# Patient Record
Sex: MALE | Race: WHITE | Employment: OTHER | URBAN - METROPOLITAN AREA
[De-identification: names, ages, dates, MRNs, and addresses within clinical notes are randomized per-mention and may not be internally consistent; named-entity substitution may affect disease eponyms.]

---

## 2018-01-28 PROBLEM — N13.9 OBSTRUCTIVE UROPATHY: Status: ACTIVE | Noted: 2018-01-28

## 2018-01-28 PROBLEM — N20.0 NEPHROLITHIASIS: Status: ACTIVE | Noted: 2018-01-28

## 2018-01-30 PROBLEM — K50.10 CROHN'S COLITIS (HCC): Status: ACTIVE | Noted: 2018-01-30

## 2018-02-28 ENCOUNTER — HOSPITAL ENCOUNTER (OUTPATIENT)
Dept: GENERAL RADIOLOGY | Age: 58
Discharge: HOME | End: 2018-02-28
Payer: COMMERCIAL

## 2018-02-28 DIAGNOSIS — N20.0 KIDNEY STONES: ICD-10-CM

## 2018-02-28 PROCEDURE — 74018 RADEX ABDOMEN 1 VIEW: CPT

## 2018-04-27 PROBLEM — K43.0 STRANGULATED INCISIONAL HERNIA: Status: ACTIVE | Noted: 2018-04-27

## 2018-05-21 PROBLEM — M87.052 IDIOPATHIC ASEPTIC NECROSIS OF LEFT FEMUR (HCC): Status: ACTIVE | Noted: 2018-01-10

## 2018-05-21 PROBLEM — K61.1 RECTAL ABSCESS: Status: ACTIVE | Noted: 2017-12-11

## 2018-05-21 PROBLEM — E55.9 VITAMIN D DEFICIENCY: Status: ACTIVE | Noted: 2018-01-10

## 2018-05-21 PROBLEM — M15.9 PRIMARY OSTEOARTHRITIS INVOLVING MULTIPLE JOINTS: Status: ACTIVE | Noted: 2018-01-10

## 2018-05-21 PROBLEM — R06.09 DYSPNEA ON EXERTION: Status: ACTIVE | Noted: 2018-01-10

## 2018-05-21 PROBLEM — R79.89 LOW TESTOSTERONE: Status: ACTIVE | Noted: 2018-01-10

## 2018-05-21 PROBLEM — R03.0 ELEVATED BLOOD PRESSURE READING WITHOUT DIAGNOSIS OF HYPERTENSION: Status: ACTIVE | Noted: 2018-01-10

## 2018-05-21 PROBLEM — Z83.511 FH: GLAUCOMA: Status: ACTIVE | Noted: 2018-01-10

## 2018-06-11 PROBLEM — R10.9 ABDOMINAL PAIN: Status: ACTIVE | Noted: 2018-06-11

## 2018-06-25 NOTE — PERIOP NOTES
PAT - SURGICAL PRE-ADMISSION INSTRUCTIONS    NAME:  Myrna James                                                          TODAY'S DATE:  6/25/2018    SURGERY DATE:  6/28/2018                                  SURGERY ARRIVAL TIME:   0930    1. Do NOT eat or drink anything, including candy or gum, after MIDNIGHT on 6/27/18 , unless you have specific instructions from your Surgeon or Anesthesia Provider to do so. 2. No smoking on the day of surgery. 3. No alcohol 24 hours prior to the day of surgery. 4. No recreational drugs for one week prior to the day of surgery. 5. Leave all valuables, including money/purse, at home. 6. Remove all jewelry, nail polish, makeup (including mascara); no lotions, powders, deodorant, or perfume/cologne/after shave. 7. Glasses/Contact lenses and Dentures may be worn to the hospital.  They will be removed prior to surgery. 8. Call your doctor if symptoms of a cold or illness develop within 24 ours prior to surgery. 9. AN ADULT MUST DRIVE YOU HOME AFTER OUTPATIENT SURGERY. 10. If you are having an OUTPATIENT procedure, please make arrangements for a responsible adult to be with you for 24 hours after your surgery. 11. If you are admitted to the hospital, you will be assigned to a bed after surgery is complete. Normally a family member will not be able to see you until you are in your assigned bed. 15. Family is encouraged to accompany you to the hospital.  We ask visitors in the treatment area to be limited to ONE person at a time to ensure patient privacy. EXCEPTIONS WILL BE MADE AS NEEDED. 15. Children under 12 are discouraged from entering the treatment area and need to be supervised by an adult when in the waiting room. Special Instructions:    Bring list of CURRENT medications . , Complete bowel prep per MD instructions. Patient Prep:    shower with anti-bacterial soap    These surgical instructions were reviewed with PAT during the PAT PHONE CALL.     Directions: On the morning of surgery, please go to the [facility]. Enter the building from the [facility] entrance, 1st floor (next to the Emergency Room entrance). Take the elevator to the 2nd floor. Sign in at the Registration Desk.     If you have any questions and/or concerns, please do not hesitate to call:  (Prior to the day of surgery)  [facility] unit:  [phone]  (Day of surgery)  [facility] unit:  [phone]

## 2018-06-27 ENCOUNTER — ANESTHESIA EVENT (OUTPATIENT)
Dept: ENDOSCOPY | Age: 58
End: 2018-06-27
Payer: COMMERCIAL

## 2018-06-28 ENCOUNTER — ANESTHESIA (OUTPATIENT)
Dept: ENDOSCOPY | Age: 58
End: 2018-06-28
Payer: COMMERCIAL

## 2018-06-28 ENCOUNTER — HOSPITAL ENCOUNTER (OUTPATIENT)
Age: 58
Setting detail: OUTPATIENT SURGERY
Discharge: HOME | End: 2018-06-28
Attending: INTERNAL MEDICINE | Admitting: INTERNAL MEDICINE
Payer: COMMERCIAL

## 2018-06-28 VITALS
WEIGHT: 141 LBS | BODY MASS INDEX: 22.66 KG/M2 | RESPIRATION RATE: 16 BRPM | OXYGEN SATURATION: 100 % | DIASTOLIC BLOOD PRESSURE: 64 MMHG | SYSTOLIC BLOOD PRESSURE: 105 MMHG | HEART RATE: 79 BPM | TEMPERATURE: 97.1 F | HEIGHT: 66 IN

## 2018-06-28 PROCEDURE — 76060000031 HC ANESTHESIA FIRST 0.5 HR: Performed by: INTERNAL MEDICINE

## 2018-06-28 PROCEDURE — 76040000019: Performed by: INTERNAL MEDICINE

## 2018-06-28 PROCEDURE — 74011250636 HC RX REV CODE- 250/636

## 2018-06-28 PROCEDURE — 74011250636 HC RX REV CODE- 250/636: Performed by: NURSE ANESTHETIST, CERTIFIED REGISTERED

## 2018-06-28 RX ORDER — SODIUM CHLORIDE 0.9 % (FLUSH) 0.9 %
5-10 SYRINGE (ML) INJECTION AS NEEDED
Status: DISCONTINUED | OUTPATIENT
Start: 2018-06-28 | End: 2018-06-28 | Stop reason: HOSPADM

## 2018-06-28 RX ORDER — PROPOFOL 10 MG/ML
INJECTION, EMULSION INTRAVENOUS
Status: DISCONTINUED | OUTPATIENT
Start: 2018-06-28 | End: 2018-06-28 | Stop reason: HOSPADM

## 2018-06-28 RX ORDER — ONDANSETRON 2 MG/ML
INJECTION INTRAMUSCULAR; INTRAVENOUS AS NEEDED
Status: DISCONTINUED | OUTPATIENT
Start: 2018-06-28 | End: 2018-06-28 | Stop reason: HOSPADM

## 2018-06-28 RX ORDER — FENTANYL CITRATE 50 UG/ML
INJECTION, SOLUTION INTRAMUSCULAR; INTRAVENOUS AS NEEDED
Status: DISCONTINUED | OUTPATIENT
Start: 2018-06-28 | End: 2018-06-28 | Stop reason: HOSPADM

## 2018-06-28 RX ORDER — SODIUM CHLORIDE 0.9 % (FLUSH) 0.9 %
5-10 SYRINGE (ML) INJECTION EVERY 8 HOURS
Status: DISCONTINUED | OUTPATIENT
Start: 2018-06-28 | End: 2018-06-28 | Stop reason: HOSPADM

## 2018-06-28 RX ORDER — INSULIN LISPRO 100 [IU]/ML
INJECTION, SOLUTION INTRAVENOUS; SUBCUTANEOUS ONCE
Status: DISCONTINUED | OUTPATIENT
Start: 2018-06-29 | End: 2018-06-28 | Stop reason: HOSPADM

## 2018-06-28 RX ORDER — DEXTROMETHORPHAN/PSEUDOEPHED 2.5-7.5/.8
1.2 DROPS ORAL
Status: DISCONTINUED | OUTPATIENT
Start: 2018-06-28 | End: 2018-06-28 | Stop reason: HOSPADM

## 2018-06-28 RX ORDER — FAMOTIDINE 20 MG/1
20 TABLET, FILM COATED ORAL ONCE
Status: DISCONTINUED | OUTPATIENT
Start: 2018-06-29 | End: 2018-06-28 | Stop reason: HOSPADM

## 2018-06-28 RX ORDER — SODIUM CHLORIDE, SODIUM LACTATE, POTASSIUM CHLORIDE, CALCIUM CHLORIDE 600; 310; 30; 20 MG/100ML; MG/100ML; MG/100ML; MG/100ML
50 INJECTION, SOLUTION INTRAVENOUS CONTINUOUS
Status: DISCONTINUED | OUTPATIENT
Start: 2018-06-29 | End: 2018-06-28 | Stop reason: HOSPADM

## 2018-06-28 RX ORDER — PROPOFOL 10 MG/ML
INJECTION, EMULSION INTRAVENOUS AS NEEDED
Status: DISCONTINUED | OUTPATIENT
Start: 2018-06-28 | End: 2018-06-28 | Stop reason: HOSPADM

## 2018-06-28 RX ADMIN — SODIUM CHLORIDE, SODIUM LACTATE, POTASSIUM CHLORIDE, AND CALCIUM CHLORIDE: 600; 310; 30; 20 INJECTION, SOLUTION INTRAVENOUS at 10:45

## 2018-06-28 RX ADMIN — FENTANYL CITRATE 50 MCG: 50 INJECTION, SOLUTION INTRAMUSCULAR; INTRAVENOUS at 10:55

## 2018-06-28 RX ADMIN — PROPOFOL 200 MCG/KG/MIN: 10 INJECTION, EMULSION INTRAVENOUS at 10:57

## 2018-06-28 RX ADMIN — ONDANSETRON 4 MG: 2 INJECTION INTRAMUSCULAR; INTRAVENOUS at 10:53

## 2018-06-28 RX ADMIN — PROPOFOL 80 MG: 10 INJECTION, EMULSION INTRAVENOUS at 10:57

## 2018-06-28 NOTE — ANESTHESIA PREPROCEDURE EVALUATION
Anesthetic History   No history of anesthetic complications            Review of Systems / Medical History  Patient summary reviewed, nursing notes reviewed and pertinent labs reviewed    Pulmonary          Shortness of breath         Neuro/Psych         Psychiatric history     Cardiovascular  Within defined limits                Exercise tolerance: >4 METS     GI/Hepatic/Renal     GERD: well controlled    Renal disease: stones      Comments: Crohn's Endo/Other        Arthritis     Other Findings   Comments: Documentation of current medication  Current medications obtained, documented and obtained? YES      Risk Factors for Postoperative nausea/vomiting:       History of postoperative nausea/vomiting? NO       Female? NO       Motion sickness? NO       Intended opioid administration for postoperative analgesia? NO      Smoking Abstinence:  Current Smoker? NO  Elective Surgery? YES  Seen preoperatively by anesthesiologist or proxy prior to day of surgery? YES  Pt abstained from smoking 24 hours prior to anesthesia?  N/A    Preventive care/screening for High Blood Pressure:  Aged 18 years and older: YES  Screened for high blood pressure: YES  Patients with high blood pressure referred to primary care provider   for BP management: YES               Physical Exam    Airway  Mallampati: III  TM Distance: 4 - 6 cm  Neck ROM: normal range of motion   Mouth opening: Normal     Cardiovascular  Regular rate and rhythm,  S1 and S2 normal,  no murmur, click, rub, or gallop  Rhythm: regular  Rate: normal         Dental  No notable dental hx       Pulmonary  Breath sounds clear to auscultation               Abdominal  GI exam deferred       Other Findings            Anesthetic Plan    ASA: 2  Anesthesia type: MAC          Induction: Intravenous  Anesthetic plan and risks discussed with: Patient

## 2018-06-28 NOTE — H&P
Date of Surgery Update:  Divya March was seen and examined. History and physical has been reviewed. The patient has been examined.  There have been no significant clinical changes since the completion of the originally dated History and Physical.    Signed By: Kelsey Dillard MD     June 28, 2018 9:08 AM

## 2018-06-28 NOTE — IP AVS SNAPSHOT
Summary of Care Report The Summary of Care report has been created to help improve care coordination. Users with access to [employer] or [address] (Web-based application) may access additional patient information including the Discharge Summary. If you are not currently a [address] user and need more information, please call the number listed below in the [place] [address] section and ask to be connected with Medical Records. Facility Information Name Address Phone [facility] [address] [place] [phone] Patient Information Patient Name Sex ANDREW Canales ([id number]) Male 1960 Discharge Information Admitting Provider Service Area Unit Benji Haney MD / [address] [place] [address] [address] Phase 2 Recovery / [phone] Discharge Provider Discharge Date/Time Discharge Disposition Destination (none) 2018 (Pending) AHR (none) Patient Language Language ENGLISH [13] Hospital Problems as of 2018  Reviewed: 2018 10:27 AM by Dariela Thomas MD  
 None Non-Hospital Problems as of 2018  Reviewed: 2018 10:27 AM by Dariela Thomas MD  
  
  
  
 Class Noted - Resolved Last Modified Active Problems Crohn disease ([address]  Unknown - Present 2018 by Courtney Christian MD  
  Entered by Pradip Thornton History of kidney stones  2016 - Present 2016 by Claudia Lim Entered by Claudia Lim Hypercalciuria  2016 - Present 2016 by Claudia Lim Entered by Claudia Lim   Obstructive uropathy  2018 - Present 2018 by Jalen Wharton MD  
  Entered by Kendall Coelho MD  
  Nephrolithiasis  2018 - Present 2018 by Jalen Wharton MD  
  Entered by Martina Kramer MD  
  Crohn's colitis ([address]  2018 - Present 2018 by Yan Remy Oniel Burgos MD  
  Entered by Cherise Mahoney MD  
  Strangulated incisional hernia  4/27/2018 - Present 4/27/2018 by Frances Bowen MD  
  Entered by Frances Bowen MD  
  Dyspnea on exertion  1/10/2018 - Present 5/21/2018 by Cynthia Rose Entered by Cynthia Rose Elevated blood pressure reading without diagnosis of hypertension  1/10/2018 - Present 5/21/2018 by Cynthia Rose Entered by Cynthia Rose FH: glaucoma  1/10/2018 - Present 5/21/2018 by Cynthia Rose Entered by Cynthia Rose Idiopathic aseptic necrosis of left femur ([address]  1/10/2018 - Present 5/21/2018 by Cynthia Rose Entered by Cynthia Rose Primary osteoarthritis involving multiple joints  1/10/2018 - Present 5/21/2018 by Cynthia Rose Entered by Cynthia Rose Low testosterone  1/10/2018 - Present 5/21/2018 by Cynthia Rose Entered by Cynthia Rose Rectal abscess  12/11/2017 - Present 5/21/2018 by Cynthia Rose Entered by Cynthia Rose Vitamin D deficiency  1/10/2018 - Present 5/21/2018 by Cynthia Rose Entered by Cynthia Rose Abdominal pain  6/11/2018 - Present 6/11/2018 by Dung Rao MD  
  Entered by Dung Rao MD  
  
You are allergic to the following No active allergies Current Discharge Medication List  
  
CONTINUE these medications which have CHANGED Dose & Instructions Dispensing Information Comments * tamsulosin 0.4 mg capsule Commonly known as:  FLOMAX What changed:   
- when to take this 
- reasons to take this Dose:  0.4 mg Take 1 Cap by mouth daily (after dinner). Quantity:  10 Cap Refills:  0  
   
 * tamsulosin 0.4 mg capsule Commonly known as:  FLOMAX What changed:  Another medication with the same name was changed. Make sure you understand how and when to take each. Dose:  0.4 mg Take 1 Cap by mouth daily (after dinner). Quantity:  90 Cap Refills:  3 * Notice: This list has 2 medication(s) that are the same as other medications prescribed for you. Read the directions carefully, and ask your doctor or other care provider to review them with you. CONTINUE these medications which have NOT CHANGED Dose & Instructions Dispensing Information Comments  
 allopurinol 100 mg tablet Commonly known as:  ZYLOPRIM  
 allopurinol 100 mg tablet Refills:  0  
   
 BENADRYL 25 mg capsule Generic drug:  diphenhydrAMINE Take 2 capsules every 4 hours by oral route. Refills:  0  
   
 budesonide 3 mg capsule Commonly known as:  ENTOCORT EC Dose:  9 mg Take 9 mg by mouth every morning. Refills:  0  
   
 * cyanocobalamin (vitamin B-12) 5,000 mcg Tbie  
 cyanocobalamin (vit B-12) 1,000 mcg/mL injection solution Refills:  0  
   
 * cyanocobalamin 1,000 mcg/mL injection Commonly known as:  VITAMIN B-12 Dose:  1000 mcg 1 mL by IntraMUSCular route every seven (7) days. Quantity:  10 Vial  
Refills:  3 ENTYVIO 300 mg injection Generic drug:  vedolizumab Entyvio 300 mg intravenous solution Refills:  0  
   
 ergocalciferol 50,000 unit capsule Commonly known as:  ERGOCALCIFEROL Dose:  95673 Units Take 1 Cap by mouth every seven (7) days. Quantity:  4 Cap Refills:  3  
   
 escitalopram oxalate 5 mg tablet Commonly known as:  Napolean Mantle escitalopram 5 mg tablet Refills:  0  
   
 hydroCHLOROthiazide 12.5 mg tablet Commonly known as:  HYDRODIURIL Dose:  12.5 mg Take 12.5 mg by mouth daily. Refills:  0 HYDROcodone-acetaminophen 5-325 mg per tablet Commonly known as:  NORCO  
 hydrocodone 5 mg-acetaminophen 325 mg tablet Refills:  0  
   
 loperamide 2 mg tablet Commonly known as:  IMMODIUM Dose:  2 mg Take 2 mg by mouth four (4) times daily as needed. Refills:  0 LORazepam 1 mg tablet Commonly known as:  ATIVAN  
 lorazepam 1 mg tablet Refills:  0 multivitamin tablet Commonly known as:  ONE A DAY Dose:  1 Tab Take 1 Tab by mouth daily. Refills:  0  
   
 omeprazole 40 mg capsule Commonly known as:  PRILOSEC Dose:  40 mg Take 40 mg by mouth two (2) times a day. Refills:  0  
   
 ondansetron 4 mg disintegrating tablet Commonly known as:  ZOFRAN ODT  
 ondansetron 4 mg disintegrating tablet Refills:  0  
   
 promethazine 25 mg tablet Commonly known as:  PHENERGAN Dose:  25 mg Take 25 mg by mouth every six (6) hours as needed. Refills:  0 REMERON 15 mg tablet Generic drug:  mirtazapine Dose:  15 mg Take 15 mg by mouth nightly. Refills:  0  
   
 sodium citrate-citric acid 500-334 mg/5 mL solution Commonly known as:  Froont Dose:  30 mL Take 30 mL by mouth daily. Refills:  0 Syringe with Needle (Disp) 3 mL 23 x 1\" Syrg Use as directed Quantity:  20 Syringe Refills:  1  
   
 * testosterone cypionate 200 mg/mL injection Commonly known as:  DEPOTESTOTERONE CYPIONATE  
 by IntraMUSCular route once. Refills:  0  
   
 * testosterone cypionate 200 mg/mL injection Commonly known as:  DEPOTESTOTERONE CYPIONATE  
 testosterone cypionate 200 mg/mL intramuscular oil Refills:  0  
   
 XTAMPZA ER PO Dose:  30 mg Take 30 mg by mouth every twelve (12) hours as needed. Refills:  0  
   
 * Notice: This list has 4 medication(s) that are the same as other medications prescribed for you. Read the directions carefully, and ask your doctor or other care provider to review them with you. Surgery Information ID Date/Time Status Primary Surgeon All Procedures Location [id number] 6/28/2018 MD Tristan  [address] [place] [address] ENDOSCOPY Follow-up Information Follow up With Details Comments Contact Info Alessandro Soriano MD   Patient can only remember the practice name and not the physician Discharge Instructions Patient Discharge Instructions Cris Gregory / [id number] : 1960 Admitted 2018 Discharged: 2018 Procedure Impression: 1. Surgical ileo-colonic anastomosis at 20cm with superficial ulceration and mild stricturing. 2. Normal ileum with no active crohn's noted. 3. Normal rectum with no active crohn's noted but imaging limited due to poor prep. Recommendation: 1. Resume regular diet, recommend high fiber 2. Start on fiber supplement daily 3. Will recommend starting on Canasa suppositories. 4. Follow up in 3 weeks. Recommended Diet: Regular Diet Recommended Activity: 1. Do not drink alcohol, drive or operate machinery for 12 hours 2. Call if any fever, abdominal pain or bleeding noted. Signed By: Lara Baron MD   
 2018 Patient armband removed and given to patient to take home. Patient was informed of the privacy risks if armband lost or stolen Chart Review Routing History Recipient Method Report Sent By Nando Burrows MD  
Phone: [phone] In [place] [address] CUSTOM LAB REPORT Aranza Caro [[id number]] 2013  2:40 PM 2013 Amalia Basnal MD  
Phone: [phone] In [facility] IP Auto Routed Filomena Tillman MD [[id number]] 6/10/2015  4:10 PM 06/10/2015 Jocelin Ye MD  
Fax: [phone] Phone: [phone] Fax IP Auto Routed Filomena Tillman MD [[id number]] 6/10/2015  4:10 PM 06/10/2015 Syed Mccarty MD  
Fax: [phone] Phone: [phone] Fax [facility] MD NOTES AUTO ROUTING REPORT Glory Wu MD [[id number]] 2018  8:53 PM 2018 Syed Mccarty MD  
Fax: [phone] Phone: [phone] Fax Smiley Astudillo MD NOTES AUTO ROUTING REPORT Philly Singh MD [[id number]] 2018 11:46 AM 2018

## 2018-06-28 NOTE — DISCHARGE INSTRUCTIONS
Patient Discharge Instructions    Kisha Weston / [id number] : 1960    Admitted 2018 Discharged: 2018         Procedure Impression:  1. Surgical ileo-colonic anastomosis at 20cm with superficial ulceration and mild stricturing. 2. Normal ileum with no active crohn's noted. 3. Normal rectum with no active crohn's noted but imaging limited due to poor prep. Recommendation:  1. Resume regular diet, recommend high fiber  2. Start on fiber supplement daily   3. Will recommend starting on Canasa suppositories. 4. Follow up in 3 weeks. Recommended Diet: Regular Diet    Recommended Activity:    1. Do not drink alcohol, drive or operate machinery for 12 hours   2. Call if any fever, abdominal pain or bleeding noted. Signed By: Néstor Chang MD     2018       Patient armband removed and given to patient to take home.   Patient was informed of the privacy risks if armband lost or stolen

## 2018-06-28 NOTE — IP AVS SNAPSHOT
[address] [place] [address] [address] 
[phone] Patient: Cris Gregory MRN: [id number] ROV:[id number] About your hospitalization You were admitted on:  June 28, 2018 You last received care in the:  [facility] PHASE 2 RECOVERY You were discharged on:  June 28, 2018 Why you were hospitalized Your primary diagnosis was:  Not on File Follow-up Information Follow up With Details Comments Contact Info Alessandro Soriano, MD   Patient can only remember the practice name and not the physician Discharge Orders None A check inna indicates which time of day the medication should be taken. My Medications CHANGE how you take these medications Instructions Each Dose to Equal  
 Morning Noon Evening Bedtime * tamsulosin 0.4 mg capsule Commonly known as:  FLOMAX What changed:   
- when to take this 
- reasons to take this Your last dose was: Your next dose is: Take 1 Cap by mouth daily (after dinner). 0.4 mg  
    
   
   
   
  
 * tamsulosin 0.4 mg capsule Commonly known as:  FLOMAX What changed:  Another medication with the same name was changed. Make sure you understand how and when to take each. Your last dose was: Your next dose is: Take 1 Cap by mouth daily (after dinner). 0.4 mg  
    
   
   
   
  
 * Notice: This list has 2 medication(s) that are the same as other medications prescribed for you. Read the directions carefully, and ask your doctor or other care provider to review them with you. CONTINUE taking these medications Instructions Each Dose to Equal  
 Morning Noon Evening Bedtime  
 allopurinol 100 mg tablet Commonly known as:  Versa Hope Your last dose was: Your next dose is:    
   
   
 allopurinol 100 mg tablet BENADRYL 25 mg capsule Generic drug:  diphenhydrAMINE Your last dose was: Your next dose is: Take 2 capsules every 4 hours by oral route. budesonide 3 mg capsule Commonly known as:  ENTOCORT EC Your last dose was: Your next dose is: Take 9 mg by mouth every morning. 9 mg * cyanocobalamin (vitamin B-12) 5,000 mcg Tbie Your last dose was: Your next dose is:    
   
   
 cyanocobalamin (vit B-12) 1,000 mcg/mL injection solution * cyanocobalamin 1,000 mcg/mL injection Commonly known as:  VITAMIN B-12 Your last dose was: Your next dose is:    
   
   
 1 mL by IntraMUSCular route every seven (7) days. 1000 mcg ENTYVIO 300 mg injection Generic drug:  vedolizumab Your last dose was: Your next dose is: Entyvio 300 mg intravenous solution  
     
   
   
   
  
 ergocalciferol 50,000 unit capsule Commonly known as:  ERGOCALCIFEROL Your last dose was: Your next dose is: Take 1 Cap by mouth every seven (7) days. 94508 Units  
    
   
   
   
  
 escitalopram oxalate 5 mg tablet Commonly known as:  Levorn Allegra Your last dose was: Your next dose is:    
   
   
 escitalopram 5 mg tablet  
     
   
   
   
  
 hydroCHLOROthiazide 12.5 mg tablet Commonly known as:  HYDRODIURIL Your last dose was: Your next dose is: Take 12.5 mg by mouth daily. 12.5 mg  
    
   
   
   
  
 HYDROcodone-acetaminophen 5-325 mg per tablet Commonly known as:  Tramaine Perry Your last dose was: Your next dose is:    
   
   
 hydrocodone 5 mg-acetaminophen 325 mg tablet  
     
   
   
   
  
 loperamide 2 mg tablet Commonly known as:  IMMODIUM Your last dose was: Your next dose is: Take 2 mg by mouth four (4) times daily as needed. 2 mg LORazepam 1 mg tablet Commonly known as:  ATIVAN Your last dose was: Your next dose is:    
   
   
 lorazepam 1 mg tablet  
     
   
   
   
  
 multivitamin tablet Commonly known as:  ONE A DAY Your last dose was: Your next dose is: Take 1 Tab by mouth daily. 1 Tab  
    
   
   
   
  
 omeprazole 40 mg capsule Commonly known as:  PRILOSEC Your last dose was: Your next dose is: Take 40 mg by mouth two (2) times a day. 40 mg  
    
   
   
   
  
 ondansetron 4 mg disintegrating tablet Commonly known as:  ZOFRAN ODT Your last dose was: Your next dose is:    
   
   
 ondansetron 4 mg disintegrating tablet  
     
   
   
   
  
 promethazine 25 mg tablet Commonly known as:  PHENERGAN Your last dose was: Your next dose is: Take 25 mg by mouth every six (6) hours as needed. 25 mg  
    
   
   
   
  
 REMERON 15 mg tablet Generic drug:  mirtazapine Your last dose was: Your next dose is: Take 15 mg by mouth nightly. 15 mg  
    
   
   
   
  
 sodium citrate-citric acid 500-334 mg/5 mL solution Commonly known as:  [email or web address] Your last dose was: Your next dose is: Take 30 mL by mouth daily. 30 mL Syringe with Needle (Disp) 3 mL 23 x 1\" Syrg Your last dose was: Your next dose is:    
   
   
 Use as directed * testosterone cypionate 200 mg/mL injection Commonly known as:  DEPOTESTOTERONE CYPIONATE Your last dose was: Your next dose is:    
   
   
 by IntraMUSCular route once. * testosterone cypionate 200 mg/mL injection Commonly known as:  DEPOTESTOTERONE CYPIONATE Your last dose was: Your next dose is:    
   
   
 testosterone cypionate 200 mg/mL intramuscular oil XTAMPZA ER PO Your last dose was: Your next dose is: Take 30 mg by mouth every twelve (12) hours as needed. 30 mg  
    
   
   
   
  
 * Notice: This list has 4 medication(s) that are the same as other medications prescribed for you. Read the directions carefully, and ask your doctor or other care provider to review them with you. Opioid Education Prescription Opioids: What You Need to Know: 
 
Prescription opioids can be used to help relieve moderate-to-severe pain and are often prescribed following a surgery or injury, or for certain health conditions. These medications can be an important part of treatment but also come with serious risks. Opioids are strong pain medicines. Examples include hydrocodone, oxycodone, fentanyl, and morphine. Heroin is an example of an illegal opioid. It is important to work with your health care provider to make sure you are getting the safest, most effective care. WHAT ARE THE RISKS AND SIDE EFFECTS OF OPIOID USE? Prescription opioids carry serious risks of addiction and overdose, especially with prolonged use. An opioid overdose, often marked by slow breathing, can cause sudden death. The use of prescription opioids can have a number of side effects as well, even when taken as directed. · Tolerance-meaning you might need to take more of a medication for the same pain relief · Physical dependence-meaning you have symptoms of withdrawal when the medication is stopped. Withdrawal symptoms can include nausea, sweating, chills, diarrhea, stomach cramps, and muscle aches. Withdrawal can last up to several weeks, depending on which drug you took and how long you took it. · Increased sensitivity to pain · Constipation · Nausea, vomiting, and dry mouth · Sleepiness and dizziness · Confusion · Depression · Low levels of testosterone that can result in lower sex drive, energy, and strength · Itching and sweating RISKS ARE GREATER WITH:      
 · History of drug misuse, substance use disorder, or overdose · Mental health conditions (such as depression or anxiety) · Sleep apnea · Older age (72 years or older) · Pregnancy Avoid alcohol while taking prescription opioids. Also, unless specifically advised by your health care provider, medications to avoid include: · Benzodiazepines (such as Xanax or Valium) · Muscle relaxants (such as Soma or Flexeril) · Hypnotics (such as Ambien or Lunesta) · Other prescription opioids KNOW YOUR OPTIONS Talk to your health care provider about ways to manage your pain that don't involve prescription opioids. Some of these options may actually work better and have fewer risks and side effects. Options may include: 
· Pain relievers such as acetaminophen, ibuprofen, and naproxen · Some medications that are also used for depression or seizures · Physical therapy and exercise · Counseling to help patients learn how to cope better with triggers of pain and stress. · Application of heat or cold compress · Massage therapy · Relaxation techniques Be Informed Make sure you know the name of your medication, how much and how often to take it, and its potential risks & side effects. IF YOU ARE PRESCRIBED OPIOIDS FOR PAIN: 
· Never take opioids in greater amounts or more often than prescribed. Remember the goal is not to be pain-free but to manage your pain at a tolerable level. · Follow up with your primary care provider to: · Work together to create a plan on how to manage your pain. · Talk about ways to help manage your pain that don't involve prescription opioids. · Talk about any and all concerns and side effects. · Help prevent misuse and abuse. · Never sell or share prescription opioids · Help prevent misuse and abuse. · Store prescription opioids in a secure place and out of reach of others (this may include visitors, children, friends, and family). · Safely dispose of unused/unwanted prescription opioids: Find your community drug take-back program or your pharmacy mail-back program, or flush them down the toilet, following guidance from the Food and Drug Administration (www.fda.gov/Drugs/ResourcesForYou). · Visit www.cdc.gov/drugoverdose to learn about the risks of opioid abuse and overdose. · If you believe you may be struggling with addiction, tell your health care provider and ask for guidance or call [facility] at 2-729-982-OMMR. Discharge Instructions Patient Discharge Instructions Marija Burton / [id number] : 1960 Admitted 2018 Discharged: 2018 Procedure Impression: 1. Surgical ileo-colonic anastomosis at 20cm with superficial ulceration and mild stricturing. 2. Normal ileum with no active crohn's noted. 3. Normal rectum with no active crohn's noted but imaging limited due to poor prep. Recommendation: 1. Resume regular diet, recommend high fiber 2. Start on fiber supplement daily 3. Will recommend starting on Canasa suppositories. 4. Follow up in 3 weeks. Recommended Diet: Regular Diet Recommended Activity: 1. Do not drink alcohol, drive or operate machinery for 12 hours 2. Call if any fever, abdominal pain or bleeding noted. Signed By: Adan Sharif MD   
 2018 Patient armband removed and given to patient to take home. Patient was informed of the privacy risks if armband lost or stolen Introducing [facility]! Dear Lena Luna: Thank you for requesting a [employer] account. Our records indicate that you already have an active Neura account. You can access your account anytime at [email or web address] Did you know that you can access your hospital and ER discharge instructions at any time in Neura?   You can also review all of your test results from your hospital stay or ER visit. Additional Information If you have questions, please visit the Frequently Asked Questions section of the [facility] website at [email or web address] Remember, [facility] is NOT to be used for urgent needs. For medical emergencies, dial 911. Now available from your iPhone and Android! Introducing Antoni Salazar As a [facility] patient, I wanted to make you aware of our electronic visit tool called Antoni Salazar. The [employer] 24/7 allows you to connect within minutes with a medical provider 24 hours a day, seven days a week via a mobile device or tablet or logging into a secure website from your computer. You can access Antoni Salazar from anywhere in the [place]. A virtual visit might be right for you when you have a simple condition and feel like you just dont want to get out of bed, or cant get away from work for an appointment, when your regular [facility] provider is not available (evenings, weekends or holidays), or when youre out of town and need minor care. Electronic visits cost only $49 and if the [facility] 24/7 provider determines a prescription is needed to treat your condition, one can be electronically transmitted to a nearby pharmacy*. Please take a moment to enroll today if you have not already done so. The enrollment process is free and takes just a few minutes. To enroll, please download the [employer] 24/[employer] elvia to your tablet or phone, or visit [email or web address] to enroll on your computer. And, as an [address] [place] [address] patient with a [employer] account, the results of your visits will be scanned into your electronic medical record and your primary care provider will be able to view the scanned results. We urge you to continue to see your regular [facility] provider for your ongoing medical care.   And while your primary care provider may not be the one available when you seek a Antoni Salazar virtual visit, the peace of mind you get from getting a real diagnosis real time can be priceless. For more information on Antoni Salazar, view our Frequently Asked Questions (FAQs) at [email or web address]. Sincerely, 
 
Brii Whitfield MD 
Chief Medical Officer Eyal Farmer *:  certain medications cannot be prescribed via Antoni Salazar Providers Seen During Your Hospitalization Provider Specialty Primary office phone Carmela Sauceda MD Gastroenterology [phone] Your Primary Care Physician (PCP) Primary Care Physician Office Phone Office Fax OTHER, PHYS ** None ** ** None ** You are allergic to the following No active allergies Recent Documentation Height Weight BMI Smoking Status 1.676 m 64 kg 22.76 kg/m2 Former Smoker Emergency Contacts Name Discharge Info Relation Home Work Mobile Susie Lozano CAREGIVER [3] Spouse [3] [phone] Lala Bullock  Son [22] [phone] Patient Belongings The following personal items are in your possession at time of discharge: 
  Dental Appliances: None  Visual Aid: Glasses Please provide this summary of care documentation to your next provider. Signatures-by signing, you are acknowledging that this After Visit Summary has been reviewed with you and you have received a copy. Patient Signature:  ____________________________________________________________ Date:  ____________________________________________________________  
  
Isis Lyons Provider Signature:  ____________________________________________________________ Date:  ____________________________________________________________

## 2018-06-28 NOTE — ANESTHESIA POSTPROCEDURE EVALUATION
Post-Anesthesia Evaluation and Assessment    Patient: Sophy Gallardo MRN: [id number]  SSN: [id number]    YOB: 1960  Age: 56 y.o. Sex: male       Cardiovascular Function/Vital Signs  Visit Vitals    /64    Pulse 79    Temp 36.2 °C (97.1 °F)    Resp 16    Ht 5' 6\" (1.676 m)    Wt 64 kg (141 lb)    SpO2 100%    BMI 22.76 kg/m2       Patient is status post MAC anesthesia for Procedure(s): FLEXIBLE SIGMOIDOSCOPY. Nausea/Vomiting: None    Postoperative hydration reviewed and adequate. Pain:  Pain Scale 1: Numeric (0 - 10) (06/28/18 1121)  Pain Intensity 1: 0 (06/28/18 1121)   Managed    Neurological Status: At baseline    Mental Status and Level of Consciousness: Alert and oriented     Pulmonary Status:   O2 Device: Room air (06/28/18 1121)   Adequate oxygenation and airway patent    Complications related to anesthesia: None    Post-anesthesia assessment completed.  No concerns    Signed By: Polina Lanier CRNA     June 28, 2018

## 2018-06-28 NOTE — PROCEDURES
Sigmoidoscopy Report    Patient: Lebron Harris MRN: [id number]  SSN: [id number]    YOB: 1960  Age: 56 y.o. Sex: male      Date of Procedure: 6/28/2018    IMPRESSION:  1. Surgical ileo-colonic anastomosis at 20cm with superficial ulceration and mild stricturing. 2. Normal ileum with no active crohn's noted. 3. Normal rectum with no active crohn's noted but imaging limited due to poor prep. RECOMMENDATIONS:  1. Resume regular diet, Recommend high fiber. 2. Recommend starting fiber supplement daily. 3. Will stop budesonide and start on Canasa suppositories. Indication:  Crohns disease  Procedure Performed: Flexible Sigmoidoscopy   Endoscopist: Kelvin Gonzalez MD  Assistant: Endoscopy Technician-1: [facility]  Endoscopy RN-1: Rosalie Barger RN  ASA: ASA 3 - Patient with moderate systemic disease with functional limitations  Mallampati Score: II (soft palate, uvula, fauces visible)  Anesthesia: MAC anesthesia  Endoscope:     []  CF H 190AL   []  PCF H190AL   [x]  GIF H 190    Extent of Examination:terminal ileum  Quality of Preparation:     []  Excellent   []  Very Good   [x] Fair but adequate   [] Fair, inadequate   []  Poor      Technique: The procedure was discussed with the patient including risks, benefits, alternatives including risks of IV sedation, bleeding, perforation and missed polyp. A safety timeout was performed. The patient was given incremental doses of intravenous sedation to achieve moderate sedation. The patients vital signs were monitored at all times including heart rate, rhythm, blood pressure and oxygen saturation. The patient was placed in left lateral position. When adequate sedation was achieved a perianal inspection and a digital rectal exam were performed. Video endoscope was introduced into the rectum and advanced under direct vision up to the terminal ileum and surgical ileo-colonic anastomosis.   With adequate insufflation and maneuvering of the withdrawing scope, the colonic mucosa was visualized carefully. Retroflexion was performed in the rectum and the distal rectum visualized. The patient tolerated the procedure very well and was transferred to recovery area. Findings:  1. Mild stricture at the anus but no active inflammation noted. Scope passed with ease. 2. The rectal mucosa appeared normal with no ulceration, polyp or mass. The imaging was limited due to poor prep. 3. There was a surgical anastomosis noted at 20cm. The anastomosis had mild stricturing but scope passed with ease. There was a superficial ulceration along the anastomosis. No high risk stigmata. There appeared to only be mild inflammation of this area. 4. The ileum mucosa was normal with no ulceration, mass, stricture.         EBL:None  Specimen: * No specimens in log *    Lidya Hill MD  June 28, 2018  11:18 AM

## 2018-07-08 PROBLEM — N32.1 ENTEROVESICAL FISTULA: Status: ACTIVE | Noted: 2018-07-08

## 2018-07-08 PROBLEM — N39.0 UTI (URINARY TRACT INFECTION): Status: ACTIVE | Noted: 2018-07-08

## 2018-07-08 PROBLEM — R10.9 FLANK PAIN: Status: ACTIVE | Noted: 2018-07-08

## 2018-08-21 PROBLEM — K92.2 GI BLEED: Status: ACTIVE | Noted: 2018-08-21

## 2018-09-25 PROBLEM — N17.9 ACUTE NONTRAUMATIC KIDNEY INJURY (HCC): Status: ACTIVE | Noted: 2018-09-25

## 2018-11-06 ENCOUNTER — ANESTHESIA EVENT (OUTPATIENT)
Dept: SURGERY | Age: 58
End: 2018-11-06
Payer: COMMERCIAL

## 2018-11-06 NOTE — PERIOP NOTES
PAT - SURGICAL PRE-ADMISSION INSTRUCTIONS    NAME:  Marta Torres                                                          TODAY'S DATE:  11/6/2018    SURGERY DATE:  11/7/2018                                  SURGERY ARRIVAL TIME:   1200    1. Do NOT eat or drink anything, including candy or gum, after 0500 on 11/07/18 , unless you have specific instructions from your Surgeon or Anesthesia Provider to do so. 2. No smoking on the day of surgery. 3. No alcohol 24 hours prior to the day of surgery. 4. No recreational drugs for one week prior to the day of surgery. 5. Leave all valuables, including money/purse, at home. 6. Remove all jewelry, nail polish, makeup (including mascara); no lotions, powders, deodorant, or perfume/cologne/after shave. 7. Glasses/Contact lenses and Dentures may be worn to the hospital.  They will be removed prior to surgery. 8. Call your doctor if symptoms of a cold or illness develop within 24 ours prior to surgery. 9. AN ADULT MUST DRIVE YOU HOME AFTER OUTPATIENT SURGERY. 10. If you are having an OUTPATIENT procedure, please make arrangements for a responsible adult to be with you for 24 hours after your surgery. 11. If you are admitted to the hospital, you will be assigned to a bed after surgery is complete. Normally a family member will not be able to see you until you are in your assigned bed. 15. Family is encouraged to accompany you to the hospital.  We ask visitors in the treatment area to be limited to ONE person at a time to ensure patient privacy. EXCEPTIONS WILL BE MADE AS NEEDED. 15. Children under 12 are discouraged from entering the treatment area and need to be supervised by an adult when in the waiting room. Special Instructions:    NONE. Patient Prep:    shower with anti-bacterial soap    These surgical instructions were reviewed with Kyleigh Donnelly during the PAT phone call. Directions:   On the morning of surgery, please go to the [facility]. Enter the building from the [facility] entrance, 1st floor (next to the Emergency Room entrance). Take the elevator to the 2nd floor. Sign in at the Registration Desk.     If you have any questions and/or concerns, please do not hesitate to call:  (Prior to the day of surgery)  [facility] unit:  [phone]  (Day of surgery)  [facility] unit:  [phone]

## 2018-11-07 ENCOUNTER — APPOINTMENT (OUTPATIENT)
Dept: GENERAL RADIOLOGY | Age: 58
End: 2018-11-07
Attending: UROLOGY
Payer: COMMERCIAL

## 2018-11-07 ENCOUNTER — ANESTHESIA (OUTPATIENT)
Dept: SURGERY | Age: 58
End: 2018-11-07
Payer: COMMERCIAL

## 2018-11-07 ENCOUNTER — HOSPITAL ENCOUNTER (OUTPATIENT)
Age: 58
Setting detail: OUTPATIENT SURGERY
Discharge: HOME | End: 2018-11-07
Attending: UROLOGY | Admitting: UROLOGY
Payer: COMMERCIAL

## 2018-11-07 VITALS
HEART RATE: 96 BPM | RESPIRATION RATE: 20 BRPM | SYSTOLIC BLOOD PRESSURE: 127 MMHG | HEIGHT: 66 IN | WEIGHT: 119.56 LBS | DIASTOLIC BLOOD PRESSURE: 77 MMHG | TEMPERATURE: 97.9 F | BODY MASS INDEX: 19.21 KG/M2 | OXYGEN SATURATION: 99 %

## 2018-11-07 PROCEDURE — 74011250636 HC RX REV CODE- 250/636: Performed by: NURSE ANESTHETIST, CERTIFIED REGISTERED

## 2018-11-07 PROCEDURE — 76210000020 HC REC RM PH II FIRST 0.5 HR: Performed by: UROLOGY

## 2018-11-07 PROCEDURE — 74011636320 HC RX REV CODE- 636/320: Performed by: UROLOGY

## 2018-11-07 PROCEDURE — C1726 CATH, BAL DIL, NON-VASCULAR: HCPCS | Performed by: UROLOGY

## 2018-11-07 PROCEDURE — 76060000033 HC ANESTHESIA 1 TO 1.5 HR: Performed by: UROLOGY

## 2018-11-07 PROCEDURE — C1758 CATHETER, URETERAL: HCPCS | Performed by: UROLOGY

## 2018-11-07 PROCEDURE — 74011250636 HC RX REV CODE- 250/636: Performed by: ANESTHESIOLOGY

## 2018-11-07 PROCEDURE — 74011000250 HC RX REV CODE- 250

## 2018-11-07 PROCEDURE — 77030018836 HC SOL IRR NACL ICUM -A: Performed by: UROLOGY

## 2018-11-07 PROCEDURE — 74011250636 HC RX REV CODE- 250/636

## 2018-11-07 PROCEDURE — 77030018846 HC SOL IRR STRL H20 ICUM -A: Performed by: UROLOGY

## 2018-11-07 PROCEDURE — 74011250636 HC RX REV CODE- 250/636: Performed by: UROLOGY

## 2018-11-07 PROCEDURE — 76010000161 HC OR TIME 1 TO 1.5 HR INTENSV-TIER 1: Performed by: UROLOGY

## 2018-11-07 PROCEDURE — 74011250637 HC RX REV CODE- 250/637: Performed by: NURSE ANESTHETIST, CERTIFIED REGISTERED

## 2018-11-07 PROCEDURE — 77030006974 HC BSKT URET RTVR BSC -C: Performed by: UROLOGY

## 2018-11-07 PROCEDURE — 77030018842 HC SOL IRR SOD CL 9% BAXT -A: Performed by: UROLOGY

## 2018-11-07 PROCEDURE — C2617 STENT, NON-COR, TEM W/O DEL: HCPCS | Performed by: UROLOGY

## 2018-11-07 PROCEDURE — 77030012961 HC IRR KT CYSTO/TUR ICUM -A: Performed by: UROLOGY

## 2018-11-07 PROCEDURE — 76210000016 HC OR PH I REC 1 TO 1.5 HR: Performed by: UROLOGY

## 2018-11-07 PROCEDURE — C1769 GUIDE WIRE: HCPCS | Performed by: UROLOGY

## 2018-11-07 PROCEDURE — 77030032490 HC SLV COMPR SCD KNE COVD -B: Performed by: UROLOGY

## 2018-11-07 PROCEDURE — 74430 CONTRAST X-RAY BLADDER: CPT

## 2018-11-07 PROCEDURE — 82360 CALCULUS ASSAY QUANT: CPT

## 2018-11-07 DEVICE — URETERAL STENT
Type: IMPLANTABLE DEVICE | Site: URETER | Status: FUNCTIONAL
Brand: POLARIS™ ULTRA

## 2018-11-07 RX ORDER — FLUCONAZOLE 100 MG/1
100 TABLET ORAL DAILY
Qty: 3 TAB | Refills: 0 | Status: SHIPPED | OUTPATIENT
Start: 2018-11-07 | End: 2018-11-10

## 2018-11-07 RX ORDER — FAMOTIDINE 20 MG/1
20 TABLET, FILM COATED ORAL ONCE
Status: COMPLETED | OUTPATIENT
Start: 2018-11-07 | End: 2018-11-07

## 2018-11-07 RX ORDER — SODIUM CHLORIDE, SODIUM LACTATE, POTASSIUM CHLORIDE, CALCIUM CHLORIDE 600; 310; 30; 20 MG/100ML; MG/100ML; MG/100ML; MG/100ML
INJECTION, SOLUTION INTRAVENOUS
Status: DISCONTINUED | OUTPATIENT
Start: 2018-11-07 | End: 2018-11-07 | Stop reason: HOSPADM

## 2018-11-07 RX ORDER — ONDANSETRON 2 MG/ML
INJECTION INTRAMUSCULAR; INTRAVENOUS AS NEEDED
Status: DISCONTINUED | OUTPATIENT
Start: 2018-11-07 | End: 2018-11-07 | Stop reason: HOSPADM

## 2018-11-07 RX ORDER — OXYCODONE AND ACETAMINOPHEN 5; 325 MG/1; MG/1
1 TABLET ORAL AS NEEDED
Status: DISCONTINUED | OUTPATIENT
Start: 2018-11-07 | End: 2018-11-07 | Stop reason: HOSPADM

## 2018-11-07 RX ORDER — SUCCINYLCHOLINE CHLORIDE 20 MG/ML
INJECTION INTRAMUSCULAR; INTRAVENOUS AS NEEDED
Status: DISCONTINUED | OUTPATIENT
Start: 2018-11-07 | End: 2018-11-07 | Stop reason: HOSPADM

## 2018-11-07 RX ORDER — FENTANYL CITRATE 50 UG/ML
50 INJECTION, SOLUTION INTRAMUSCULAR; INTRAVENOUS
Status: COMPLETED | OUTPATIENT
Start: 2018-11-07 | End: 2018-11-07

## 2018-11-07 RX ORDER — CEFAZOLIN SODIUM 2 G/50ML
2 SOLUTION INTRAVENOUS
Status: COMPLETED | OUTPATIENT
Start: 2018-11-07 | End: 2018-11-07

## 2018-11-07 RX ORDER — SODIUM CHLORIDE, SODIUM LACTATE, POTASSIUM CHLORIDE, CALCIUM CHLORIDE 600; 310; 30; 20 MG/100ML; MG/100ML; MG/100ML; MG/100ML
25 INJECTION, SOLUTION INTRAVENOUS CONTINUOUS
Status: DISCONTINUED | OUTPATIENT
Start: 2018-11-07 | End: 2018-11-07 | Stop reason: HOSPADM

## 2018-11-07 RX ORDER — MORPHINE SULFATE 4 MG/ML
2 INJECTION INTRAVENOUS
Status: DISCONTINUED | OUTPATIENT
Start: 2018-11-07 | End: 2018-11-07 | Stop reason: HOSPADM

## 2018-11-07 RX ORDER — NEOSTIGMINE METHYLSULFATE 5 MG/5 ML
SYRINGE (ML) INTRAVENOUS AS NEEDED
Status: DISCONTINUED | OUTPATIENT
Start: 2018-11-07 | End: 2018-11-07 | Stop reason: HOSPADM

## 2018-11-07 RX ORDER — OXYCODONE AND ACETAMINOPHEN 5; 325 MG/1; MG/1
1 TABLET ORAL
Qty: 15 TAB | Refills: 0 | Status: SHIPPED | OUTPATIENT
Start: 2018-11-07 | End: 2019-02-19

## 2018-11-07 RX ORDER — DEXAMETHASONE SODIUM PHOSPHATE 4 MG/ML
INJECTION, SOLUTION INTRA-ARTICULAR; INTRALESIONAL; INTRAMUSCULAR; INTRAVENOUS; SOFT TISSUE AS NEEDED
Status: DISCONTINUED | OUTPATIENT
Start: 2018-11-07 | End: 2018-11-07 | Stop reason: HOSPADM

## 2018-11-07 RX ORDER — HYDROMORPHONE HYDROCHLORIDE 2 MG/ML
0.5 INJECTION, SOLUTION INTRAMUSCULAR; INTRAVENOUS; SUBCUTANEOUS
Status: DISCONTINUED | OUTPATIENT
Start: 2018-11-07 | End: 2018-11-07 | Stop reason: HOSPADM

## 2018-11-07 RX ORDER — FENTANYL CITRATE 50 UG/ML
INJECTION, SOLUTION INTRAMUSCULAR; INTRAVENOUS AS NEEDED
Status: DISCONTINUED | OUTPATIENT
Start: 2018-11-07 | End: 2018-11-07 | Stop reason: HOSPADM

## 2018-11-07 RX ORDER — SODIUM CHLORIDE, SODIUM LACTATE, POTASSIUM CHLORIDE, CALCIUM CHLORIDE 600; 310; 30; 20 MG/100ML; MG/100ML; MG/100ML; MG/100ML
50 INJECTION, SOLUTION INTRAVENOUS CONTINUOUS
Status: DISCONTINUED | OUTPATIENT
Start: 2018-11-07 | End: 2018-11-07 | Stop reason: HOSPADM

## 2018-11-07 RX ORDER — INSULIN LISPRO 100 [IU]/ML
INJECTION, SOLUTION INTRAVENOUS; SUBCUTANEOUS ONCE
Status: DISCONTINUED | OUTPATIENT
Start: 2018-11-07 | End: 2018-11-07 | Stop reason: HOSPADM

## 2018-11-07 RX ORDER — GENTAMICIN SULFATE 40 MG/ML
INJECTION, SOLUTION INTRAMUSCULAR; INTRAVENOUS AS NEEDED
Status: DISCONTINUED | OUTPATIENT
Start: 2018-11-07 | End: 2018-11-07 | Stop reason: HOSPADM

## 2018-11-07 RX ORDER — PROPOFOL 10 MG/ML
INJECTION, EMULSION INTRAVENOUS AS NEEDED
Status: DISCONTINUED | OUTPATIENT
Start: 2018-11-07 | End: 2018-11-07 | Stop reason: HOSPADM

## 2018-11-07 RX ORDER — ROCURONIUM BROMIDE 10 MG/ML
INJECTION, SOLUTION INTRAVENOUS AS NEEDED
Status: DISCONTINUED | OUTPATIENT
Start: 2018-11-07 | End: 2018-11-07 | Stop reason: HOSPADM

## 2018-11-07 RX ORDER — GLYCOPYRROLATE 0.2 MG/ML
INJECTION INTRAMUSCULAR; INTRAVENOUS AS NEEDED
Status: DISCONTINUED | OUTPATIENT
Start: 2018-11-07 | End: 2018-11-07 | Stop reason: HOSPADM

## 2018-11-07 RX ORDER — MIDAZOLAM HYDROCHLORIDE 1 MG/ML
INJECTION, SOLUTION INTRAMUSCULAR; INTRAVENOUS AS NEEDED
Status: DISCONTINUED | OUTPATIENT
Start: 2018-11-07 | End: 2018-11-07 | Stop reason: HOSPADM

## 2018-11-07 RX ORDER — LIDOCAINE HYDROCHLORIDE 20 MG/ML
INJECTION, SOLUTION EPIDURAL; INFILTRATION; INTRACAUDAL; PERINEURAL AS NEEDED
Status: DISCONTINUED | OUTPATIENT
Start: 2018-11-07 | End: 2018-11-07 | Stop reason: HOSPADM

## 2018-11-07 RX ADMIN — MIDAZOLAM HYDROCHLORIDE 2 MG: 1 INJECTION, SOLUTION INTRAMUSCULAR; INTRAVENOUS at 15:04

## 2018-11-07 RX ADMIN — FENTANYL CITRATE 100 MCG: 50 INJECTION, SOLUTION INTRAMUSCULAR; INTRAVENOUS at 15:10

## 2018-11-07 RX ADMIN — HYDROMORPHONE HYDROCHLORIDE 0.5 MG: 2 INJECTION INTRAMUSCULAR; INTRAVENOUS; SUBCUTANEOUS at 17:27

## 2018-11-07 RX ADMIN — CEFAZOLIN SODIUM 2 G: 2 SOLUTION INTRAVENOUS at 15:27

## 2018-11-07 RX ADMIN — SODIUM CHLORIDE, SODIUM LACTATE, POTASSIUM CHLORIDE, CALCIUM CHLORIDE: 600; 310; 30; 20 INJECTION, SOLUTION INTRAVENOUS at 15:06

## 2018-11-07 RX ADMIN — Medication 2 MG: at 16:14

## 2018-11-07 RX ADMIN — OXYCODONE HYDROCHLORIDE AND ACETAMINOPHEN 1 TABLET: 5; 325 TABLET ORAL at 17:31

## 2018-11-07 RX ADMIN — PROPOFOL 100 MG: 10 INJECTION, EMULSION INTRAVENOUS at 15:10

## 2018-11-07 RX ADMIN — MORPHINE SULFATE 2 MG: 4 INJECTION INTRAVENOUS at 14:09

## 2018-11-07 RX ADMIN — SODIUM CHLORIDE, SODIUM LACTATE, POTASSIUM CHLORIDE, AND CALCIUM CHLORIDE 50 ML/HR: 600; 310; 30; 20 INJECTION, SOLUTION INTRAVENOUS at 12:27

## 2018-11-07 RX ADMIN — ROCURONIUM BROMIDE 20 MG: 10 INJECTION, SOLUTION INTRAVENOUS at 15:15

## 2018-11-07 RX ADMIN — FAMOTIDINE 20 MG: 20 TABLET ORAL at 12:26

## 2018-11-07 RX ADMIN — LIDOCAINE HYDROCHLORIDE 80 MG: 20 INJECTION, SOLUTION EPIDURAL; INFILTRATION; INTRACAUDAL; PERINEURAL at 15:10

## 2018-11-07 RX ADMIN — FENTANYL CITRATE 50 MCG: 50 INJECTION, SOLUTION INTRAMUSCULAR; INTRAVENOUS at 16:56

## 2018-11-07 RX ADMIN — GLYCOPYRROLATE 0.2 MG: 0.2 INJECTION INTRAMUSCULAR; INTRAVENOUS at 16:14

## 2018-11-07 RX ADMIN — DEXAMETHASONE SODIUM PHOSPHATE 4 MG: 4 INJECTION, SOLUTION INTRA-ARTICULAR; INTRALESIONAL; INTRAMUSCULAR; INTRAVENOUS; SOFT TISSUE at 15:27

## 2018-11-07 RX ADMIN — MORPHINE SULFATE 2 MG: 4 INJECTION INTRAVENOUS at 17:08

## 2018-11-07 RX ADMIN — FENTANYL CITRATE 50 MCG: 50 INJECTION, SOLUTION INTRAMUSCULAR; INTRAVENOUS at 16:45

## 2018-11-07 RX ADMIN — MORPHINE SULFATE 2 MG: 4 INJECTION INTRAVENOUS at 17:02

## 2018-11-07 RX ADMIN — HYDROMORPHONE HYDROCHLORIDE 0.5 MG: 2 INJECTION INTRAMUSCULAR; INTRAVENOUS; SUBCUTANEOUS at 17:42

## 2018-11-07 RX ADMIN — ONDANSETRON 4 MG: 2 INJECTION INTRAMUSCULAR; INTRAVENOUS at 15:27

## 2018-11-07 RX ADMIN — ROCURONIUM BROMIDE 10 MG: 10 INJECTION, SOLUTION INTRAVENOUS at 15:28

## 2018-11-07 RX ADMIN — HYDROMORPHONE HYDROCHLORIDE 0.5 MG: 2 INJECTION INTRAMUSCULAR; INTRAVENOUS; SUBCUTANEOUS at 17:12

## 2018-11-07 RX ADMIN — SUCCINYLCHOLINE CHLORIDE 100 MG: 20 INJECTION INTRAMUSCULAR; INTRAVENOUS at 15:08

## 2018-11-07 NOTE — PERIOP NOTES
Patient stated pain 6/10. Appears more comfortable. VSS. Handoff report given to Yessenia Velazquez RN.

## 2018-11-07 NOTE — PERIOP NOTES
Phase 2 Recovery Summary  Patient arrived to Phase 2 at 1751  Report received from [facility], RN VSS. Per patient, pain is manageable. Vitals:    11/07/18 1734 11/07/18 1747 11/07/18 1748 11/07/18 1753   BP: 149/81   127/77   Pulse:    96   Resp: 13 13 19 20   Temp:       SpO2: 99% 100% 100% 99%   Weight:       Height:           oriented to time, place, person and situation    Lines and Drains  Peripheral Intravenous Line:   Peripheral IV 11/07/18 Left Wrist (Active)   Site Assessment Clean, dry, & intact 11/7/2018  5:55 PM   Phlebitis Assessment 0 11/7/2018  5:55 PM   Infiltration Assessment 0 11/7/2018  5:55 PM   Dressing Status Clean, dry, & intact 11/7/2018  5:55 PM   Dressing Type Tape;Transparent 11/7/2018  5:55 PM   Hub Color/Line Status Pink; Infusing 11/7/2018  5:55 PM   Action Taken Open ports on tubing capped 11/7/2018  4:53 PM   Alcohol Cap Used Yes 11/7/2018  5:55 PM       Wound  Wound Abdomen Mid;Lower (Active)   Number of days: 119       Wound Abdomen (Active)   Number of days: 117       Wound Abdomen (Active)   Number of days: 72       Wound Groin (Active)   Number of days: 42       Wound Abdomen Anterior (Active)   Number of days: 42       Wound Penis (Active)   DRESSING STATUS Clean, dry, and intact 11/7/2018  5:56 PM   DRESSING TYPE Transparent film 11/7/2018  5:56 PM   Number of days: 0          Patient discharged to home with wife at 0. No questions or concerns at this time.      Ya Sierra RN

## 2018-11-07 NOTE — ANESTHESIA PREPROCEDURE EVALUATION
Anesthetic History No history of anesthetic complications Review of Systems / Medical History Patient summary reviewed and pertinent labs reviewed Pulmonary Shortness of breath Neuro/Psych Psychiatric history Cardiovascular Within defined limits Exercise tolerance: >4 METS 
  
GI/Hepatic/Renal 
  
GERD: well controlled Renal disease: stones Comments: Crohn's Endo/Other Arthritis Other Findings Comments: Documentation of current medication Current medications obtained, documented and obtained? YES Risk Factors for Postoperative nausea/vomiting: 
     History of postoperative nausea/vomiting? NO Female? NO Motion sickness? NO Intended opioid administration for postoperative analgesia? NO Smoking Abstinence: 
Current Smoker? NO Elective Surgery? YES Seen preoperatively by anesthesiologist or proxy prior to day of surgery? YES Pt abstained from smoking 24 hours prior to anesthesia? N/A Preventive care/screening for High Blood Pressure: 
Aged 18 years and older: YES Screened for high blood pressure: YES Patients with high blood pressure referred to primary care provider 
 for BP management: YES Physical Exam 
 
Airway Mallampati: III 
TM Distance: 4 - 6 cm Neck ROM: normal range of motion Mouth opening: Normal 
 
 Cardiovascular Regular rate and rhythm,  S1 and S2 normal,  no murmur, click, rub, or gallop Rhythm: regular Rate: normal 
 
 
 
 Dental 
No notable dental hx Pulmonary Breath sounds clear to auscultation Abdominal 
GI exam deferred Other Findings Anesthetic Plan ASA: 2 Anesthesia type: general 
 
 
 
 
Induction: Intravenous Anesthetic plan and risks discussed with: Patient

## 2018-11-07 NOTE — DISCHARGE INSTRUCTIONS
Cystoscopy: What to Expect at [address] [facility] [place]    A cystoscopy is a procedure that lets a doctor look inside of the bladder and the urethra. The urethra is the tube that carries urine from the bladder to outside the body. The doctor uses a thin, lighted tool called a cystoscope. Your bladder is filled with fluid. This stretches the bladder so that your doctor can look closely at the inside of your bladder. After the cystoscopy, your urethra may be sore at first, and it may burn when you urinate for the first few days after the procedure. You may feel the need to urinate more often, and your urine may be pink. These symptoms should get better in 1 or 2 days. You will probably be able to go back to most of your usual activities in 1 or 2 days. This care sheet gives you a general idea about how long it will take for you to recover. But each person recovers at a different pace. Follow the steps below to get better as quickly as possible. How can you care for yourself at home? Activity    · Rest when you feel tired. Getting enough sleep will help you recover.     · Try to walk each day. Start by walking a little more than you did the day before. Bit by bit, increase the amount you walk. Walking boosts blood flow and helps prevent pneumonia and constipation.     · Avoid strenuous activities, such as bicycle riding, jogging, weight lifting, or aerobic exercise, until your doctor says it is okay.     · Ask your doctor when you can drive again.     · Most people are able to return to work within 1 or 2 days after the procedure.     · You may shower and take baths as usual.     · Ask your doctor when it is okay for you to have sex. Diet    · You can eat your normal diet. If your stomach is upset, try bland, low-fat foods like plain rice, broiled chicken, toast, and yogurt.     · Drink plenty of fluids (unless your doctor tells you not to). Medicines    · Take pain medicines exactly as directed.   ? If the doctor gave you a prescription medicine for pain, take it as prescribed. ? If you are not taking a prescription pain medicine, ask your doctor if you can take an over-the-counter medicine.     · If you think your pain medicine is making you sick to your stomach:  ? Take your medicine after meals (unless your doctor has told you not to). ? Ask your doctor for a different pain medicine.     · If your doctor prescribed antibiotics, take them as directed. Do not stop taking them just because you feel better. You need to take the full course of antibiotics. Follow-up care is a key part of your treatment and safety. Be sure to make and go to all appointments, and call your doctor if you are having problems. It's also a good idea to know your test results and keep a list of the medicines you take. When should you call for help? Call 911 anytime you think you may need emergency care. For example, call if:    · You passed out (lost consciousness).     · You have severe trouble breathing.     · You have sudden chest pain and shortness of breath, or you cough up blood.     · You have severe belly pain.    Call your doctor now or seek immediate medical care if:    · You are sick to your stomach or cannot keep fluids down.     · Your urine is still red or you see blood clots after you have urinated several times.     · You have trouble passing urine or stool, especially if you have pain or swelling in your lower belly.     · You have signs of a blood clot, such as:  ? Pain in your calf, back of the knee, thigh, or groin. ? Redness and swelling in your leg or groin.     · You develop a fever or severe chills.     · You have pain in your back just below your rib cage. This is called flank pain.    Watch closely for changes in your health, and be sure to contact your doctor if:    · You have pain or burning when you urinate.  A burning feeling is normal for a day or two after the test, but call if it does not get better.     · You have a frequent urge to urinate but can pass only small amounts of urine.     · Your urine is pink, red, or cloudy, or smells bad. It is normal for the urine to have a pinkish color for a few days after the test, but call if it does not get better. Where can you learn more? Go to http://akila-jon.info/. Enter K926 in the search box to learn more about \"Cystoscopy: What to Expect at Home. \"  Current as of: March 21, 2018  Content Version: 11.8  © 9526-8266 [employer]. Care instructions adapted under license by [employer] (which disclaims liability or warranty for this information). If you have questions about a medical condition or this instruction, always ask your healthcare professional. [place] [address] any warranty or liability for your use of this information. DISCHARGE SUMMARY from Nurse    PATIENT INSTRUCTIONS:    After general anesthesia or intravenous sedation, for 24 hours or while taking prescription Narcotics:  · Limit your activities  · Do not drive and operate hazardous machinery  · Do not make important personal or business decisions  · Do  not drink alcoholic beverages  · If you have not urinated within 8 hours after discharge, please contact your surgeon on call.     Report the following to your surgeon:  · Excessive pain, swelling, redness or odor of or around the surgical area  · Temperature over 100.5  · Nausea and vomiting lasting longer than 4 hours or if unable to take medications  · Any signs of decreased circulation or nerve impairment to extremity: change in color, persistent  numbness, tingling, coldness or increase pain  · Any questions    What to do at Home:  Recommended activity: Activity as tolerated and no driving for today, These are general instructions for a healthy lifestyle:    No smoking/ No tobacco products/ Avoid exposure to second hand smoke  Surgeon General's Warning:  Quitting smoking now greatly reduces serious risk to your health. Obesity, smoking, and sedentary lifestyle greatly increases your risk for illness    A healthy diet, regular physical exercise & weight monitoring are important for maintaining a healthy lifestyle    You may be retaining fluid if you have a history of heart failure or if you experience any of the following symptoms:  Weight gain of 3 pounds or more overnight or 5 pounds in a week, increased swelling in our hands or feet or shortness of breath while lying flat in bed. Please call your doctor as soon as you notice any of these symptoms; do not wait until your next office visit. Recognize signs and symptoms of STROKE:    F-face looks uneven    A-arms unable to move or move unevenly    S-speech slurred or non-existent    T-time-call 911 as soon as signs and symptoms begin-DO NOT go       Back to bed or wait to see if you get better-TIME IS BRAIN. Warning Signs of HEART ATTACK     Call 911 if you have these symptoms:   Chest discomfort. Most heart attacks involve discomfort in the center of the chest that lasts more than a few minutes, or that goes away and comes back. It can feel like uncomfortable pressure, squeezing, fullness, or pain.  Discomfort in other areas of the upper body. Symptoms can include pain or discomfort in one or both arms, the back, neck, jaw, or stomach.  Shortness of breath with or without chest discomfort.  Other signs may include breaking out in a cold sweat, nausea, or lightheadedness. Don't wait more than five minutes to call 911 - MINUTES MATTER! Fast action can save your life. Calling 911 is almost always the fastest way to get lifesaving treatment. Emergency Medical Services staff can begin treatment when they arrive -- up to an hour sooner than if someone gets to the hospital by car. The discharge information has been reviewed with the patient. The patient verbalized understanding.   Discharge medications reviewed with the patient and appropriate educational materials and side effects teaching were provided. ___________________________________________________________________________________________________________________________________  Patient armband removed and given to patient to take home.   Patient was informed of the privacy risks if armband lost or stolen

## 2018-11-07 NOTE — PERIOP NOTES
Patient complained of pain 10/10 to lower abdomen. Dr. Ruel Kraus was called and received order for Morphine 2mg IV. 2 mg was given. Patient was placed on continuous pulse ox. Will continue to monitor closely.

## 2018-11-07 NOTE — ANESTHESIA POSTPROCEDURE EVALUATION
Procedure(s): 
CYSTOSCOPY right  URETEROSCOPY laser lithotripsy with right stent exchange. Anesthesia Post Evaluation Multimodal analgesia: multimodal analgesia used between 6 hours prior to anesthesia start to PACU discharge Patient location during evaluation: bedside Patient participation: complete - patient participated Level of consciousness: awake Pain management: adequate Airway patency: patent Anesthetic complications: no 
Cardiovascular status: acceptable Respiratory status: acceptable Hydration status: acceptable Visit Vitals /77 Pulse 96 Temp 36.6 °C (97.9 °F) Resp 20 Ht 5' 6\" (1.676 m) Wt 54.2 kg (119 lb 9 oz) SpO2 99% BMI 19.30 kg/m²

## 2018-11-07 NOTE — BRIEF OP NOTE
BRIEF OPERATIVE NOTE    Date of Procedure: 11/7/2018   Preoperative Diagnosis: kidney stone  n20.0  Postoperative Diagnosis: kidney stone  n20.0    Procedure(s):  CYSTOSCOPY right  URETEROSCOPY laser lithotripsy with right stent exchange  Surgeon(s) and Role:     * Bossman Collins MD - Primary         Surgical Assistant:  none    Surgical Staff:  Circ-1: Edmar Edwards RN  Radiology Technician: Hollace Scheuermann R  Scrub Tech-1: [employer]  Event Time In Time Out   Incision Start  3:25 PM    Incision Close  4:13 PM      Anesthesia: General   Estimated Blood Loss: minimal  Specimens:   ID Type Source Tests Collected by Time Destination   1 : 2 miniscule right ureteral stone fragments for analysis  URETER, RIGHT  Bossman Collins MD 11/7/2018  4:12 PM Pathology      Findings: impacted 4 mm proximal ureteral stone located and laser fragmented to dust only tiny fragments sent for chemical analysis. Complications: none  Implants:   Implant Name Type Inv.  Item Serial No.  Lot No. LRB No. Used Junior Kothari [id number] Stacy [place] [id number] -- Nicholas Cordero [id number] Right 1 Implanted       Karley Machado MD

## 2018-11-07 NOTE — H&P
Donna Patel  1960       Urology Progress Note   6/5/2018     ASSESSMENT:         Encounter Diagnoses       ICD-10-CM ICD-9-CM   1. Kidney stones N20.0 592.0   2. Hypogonadism male E29.1 257.2      1. Left Urolithiasis              Prior Surgery: Most recently Left URS on 1/30/2018, see others outlined below              Stone Analysis: none              Most Recent Imaging: CT 1/28/2018 - Left hydronephrosis, 8mm left UPJ stone              Medical Therapy: HCTZ 15 mg once daily and Urocit K 15 mEQ BID by nephrology               Metabolic Workup: 24 hour urine in 2013 was normal except low urine volume     2. Adult onset Hypogonadism              Currently on TRT, testosterone cypionate 0.5 mL q 2 weeks and B 12.- managed by pcp               Testosterone 484 4/17/2018     3. Enterovesical Fistula 2/2 Crohn's Disease               - s/p Laparotomy, lysis of adhesions, takedown of enterocutaneous fistula, and segmental small bowel resection on 7/13/2018 by Shyam Lopez and Geovanna         PLAN:   · Will schedule for Right URS retrograde, laser lithotripsy, right JJ stent exchange - letter sent  · Continue follow up with Dr. Ramesh Ferrer, ID,  for fungal infection             Chief Complaint   Patient presents with    Post OP Follow Up    Other       Pt states he needs a refill on testosterone with needles      History of Present Illness: Donna Patel is a 66 y.o.  male who presents today in follow up for nephrolithiasis and stent management. Patient with extensive hx of kidney stones. Patient reported to the ER on 4/27/2018 for lower abdominal pain. Laparatomy revealed enterocutaneous fistula at ileosigmoid anastomosis. Patient s/p resection. CT at the time also showed a 4mm obstructing stone in the proximal left ureter. He is S/p exploratory laparotomy 8/27/2018 due to a GI bleed. He presented to ED 9/11/2018 with n/v. He presented to ED on 9/262018 with dehydration, UTI, hydronephrosis and sepsis. Terrea Sous stent was placed at that time.     Patient reports low energy since most recent surgery  Will begin PT next week  No f/c/n/v  Denies dysuria     Patient reports several abd/bowel surgeries, most recently late 8/2018  He has a known history of Crohn's disease. Patient has an extensive history of kidney stones:  (Per HPI by Dr. Josh Espinosa on 3/1/2018): He is S/p left laser lithotripsy in 6/2015 and s/p left ureteroscopy with laser lithotripsy on 11/13/15 with Dr. Yung Giron. Stone analyses: 100% uric acid. F/u renal US showed 2mm right renal stone, no hydro. Pt stated he passed the stone in the interim. Litholink revealed low urine volume and hypercalciuria (400). Was started on Urocit K 15meq PO BID. Reported seeing the tablet in his stool and was worried of poor absorption of the supplement. The patient reported drinking approx. 6-10 bottles of water every day. Patient presented to clinic 8/30/2017 c/o left groin pain and left lower back pain . He had CT A/P 8/17/2017 revealed 11.4 mm nonobstructing stone within the lower renal pelvis of the left kidney.      KUB 8/30/2017 - 1cm left LP stone, punctate right MP and right LP stones, Tiny 1 to 2 mm nonobstructing stones in the right kidney. He is S/p Cystoscopy, left Ureteroscopy, Laser lithotripsy, Left ureteral stent placement 9/5/2017 for 1 cm left renal pelvis stone. FADY 12/5/2017 - Bilateral non-obstructing stones, largest inferior left kidney. - suspect collection of gravel.     Patient was admitted in the hospital 1/28/2018 for obstructive stone. He presented c/o Abdominal pain. He had CT A/P 1/28/2018 which revealed left hydronephrosis and an 8 mm left UPJ stone.  He is s/p Cystourethroscopy, Left ureteroscopy with laser lithotripsy, Left retrograde pyelogram, Left double-J stent placement 1/30/2018 by Dr. Tramaine Merida.     S/p Cystoscopy, Left ureteral stent exchange, Left semirigid and flexible ureteroscopy with laser lithotripsy, Stone extraction 6/2015  Bubba.  S/p left ureteroscopy with laser lithotripsy on 11/13/15 with Dr. Jacob Seo. S/p Cystoscopy, left Ureteroscopy, Laser lithotripsy, Left ureteral stent placement 9/5/2017 for 1 cm left renal pelvis stone. S/p Cystourethroscopy, Left ureteroscopy, Left retrograde pyelogram, Left double-J stent placement 1/30/2018 by Dr. Irasema Richmond for 8 mm UPJ stone   S/p cystoscopy, right JJ stent on 7/11/2018 by Dr. Zeina Cunningham:        KUB 8/30/2017  1cm Left LP stone, punctate right MP and right LP stones     FADY 12/5/2017  Bilateral non-obstructing stones, largest inferior left kidney  Suspect collection of gravel      CT ABD/PELV 1/28/2018  Left hydronephrosis  8mm left UPJ stone        CT grecia pelv W contrast 4/27/2018  IMPRESSION:  1. Incisional ventral hernia with findings suggesting Jorgito incarceration but  probable strangulation. 2. 4 mm obstructing stone in the proximal left ureter. Mild left hydronephrosis. 3. Nonobstructing stone at the lower pole the right kidney. 4. Hydropic gallbladder.     CT A/P 1/28/2018  IMPRESSION:  1. Obstructing 8 x 4 mm stone left ureteropelvic junction. 2. Apparent partial colectomy and enterocolic anastomosis with midline ventral  small hernia containing bowel without evidence of obstruction. Mild bowel wall  thickening in this region possibly due to adjacent scarring.     FADY 12/5/2017   Impression:  PRELIMINARY REPORT  Left pelviectasis, no hydroureter. Bilateral non-obstructing stones, largest inferior left kidney. Non-fully distended bladder. Prostate = 24 grams.     CT A/P 8/17/2017  There is a 11.4 mm nonobstructing stone within the lower renal pelvis of the  left kidney. 2. Tiny 1 to 2 mm nonobstructing stones in the right kidney. 3. Previous colectomy.   4. 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     Stone analysis:   11/2015:  Uric acid          100%     6/2015:  Ca oxalate dihydrate 5%  Ca oxalate monohydrate        10%  Calcium phosphate                 5%  Uric acid                                  80%     10/2013:  Uric acid                                  100%     5/2013:  Ca oxalate dihydrate               15%  Ca oxalate monohydrate        30%  Calcium phosphate                 5%  Uric acid                                  50%        Recent labs:   BMP: 7/2016  Sodium 140   136 - 145 mEq/L Final   Potassium 4.0   3.5 - 5.1 mEq/L Final   Chloride 107   98 - 107 mEq/L Final   CO2 24   21 - 32 mEq/L Final   Glucose 129 (H) 74 - 106 mg/dl Final   BUN 8   7 - 25 mg/dl Final   Creatinine 1.1   0.6 - 1.3 mg/dl Final   GFR est AA >60       Final   GFR est non-AA >60       Final   Calcium 9.1   8.5 - 10.1 mg/dl Final      Ca: 9.1 in 7/2016  PTH: n/a  Uric acid: n/a     Last metabolic workup:   24 hour urine in 2013 was essentially normal except low urine volume     Litholink 4/2016 was reviewed with the patient in detail and is outlined below:      Urine Volume:                                     1.60  SS CaOx                                 7.83  Urine Calcium                         400  Urine Oxalate                          28  Urine Citrate                            634  SS CaP                                   1.67  24 Hour Urine pH                    5.614  SS Uric Acid                            2.8  Urine Uric Acid                        1.169  Sodium                                    197  Phos24                                    1.756  UUN24                                                11.12  Cr/kg ratio                               39.7     Currently on metabolic therapy: NO     Personal history of nephrolithiasis: YES          Past Medical History:   Diagnosis Date    Arthritis      Arthropathy, unspecified, site unspecified      Bowel obstruction (HCC)      Carpal tunnel syndrome      Crohn disease (HCC)      Depression      Esophageal reflux      Flank pain      GERD (gastroesophageal reflux disease)      Hematuria      Hematuria, gross      Ill-defined condition       issues with low testosterone levels    Kidney stone      Leukocytes in urine      Pain management      Pneumonia       history    Ureteral calculus        Past Surgical History:   Procedure Laterality Date    APPENDECTOMY        CYSTOSCOPY   12/23/2011     Cysto,bilateral retrograde pyelogram,right ureteroscopy with laser lithotripsy and basket extraction of kidney stone,right stent placement    HX CARPAL TUNNEL RELEASE        HX CERVICAL FUSION   1999    HX COLECTOMY   2/2010    HX COLECTOMY   2006    HX COLOSTOMY        HX HERNIA REPAIR   2018    HX LITHOTRIPSY   5/3/13     Left ESWL    HX ORTHOPAEDIC Right 2010     carpal tunnel    HX OTHER SURGICAL   3/2009     Exploratory lap of ilium with anastomosis    HX OTHER SURGICAL   10/21/2011     ESWL    HX OTHER SURGICAL   10/18/2013 & 12/4/2013     Ureteroscopy    HX OTHER SURGICAL         Neck Surgery    HX OTHER SURGICAL   4/23/2014     neck surgery with screws    HX OTHER SURGICAL   7/11/2014     RT hand    HX SMALL BOWEL RESECTION   2001    HX UROLOGICAL   11/12/15     Cystoscopy, LEFT retrograde pyelogram, & stent                Current Outpatient Medications on File Prior to Visit   Medication Sig Dispense Refill    fluconazole (DIFLUCAN) 200 mg tablet Take 200 mg by mouth daily. FDA advises cautious prescribing of oral fluconazole in pregnancy.        allopurinol (ZYLOPRIM) 100 mg tablet Take 100 mg by mouth daily.        diphenhydrAMINE (BENADRYL) 25 mg capsule Take 50 mg by mouth every four (4) hours as needed for Itching.        escitalopram oxalate (LEXAPRO) 5 mg tablet Take 5 mg by mouth daily.        pantoprazole (PROTONIX) 40 mg tablet Take 40 mg by mouth daily.        tamsulosin (FLOMAX) 0.4 mg capsule Take 0.4 mg by mouth daily as needed (urinary retention).         buprenorphine (BUTRANS) 10 mcg/hour 1 Patch by TransDERmal route every seven (7) days. Max Daily Amount: 1 Patch. 2 Patch 0    magnesium oxide (MAG-OX) 400 mg tablet Take 1 Tab by mouth daily. 30 Tab 1    oxyCODONE IR (ROXICODONE) 5 mg immediate release tablet Take 1-2 Tabs by mouth every four (4) hours as needed. Max Daily Amount: 60 mg. 20 Tab 0    therapeutic multivitamin-minerals (THERAGRAN-M) tablet Take 1 Tab by mouth daily. 30 Tab 1    dronabinol (MARINOL) 5 mg capsule Take 1 Cap by mouth Before breakfast and dinner. Max Daily Amount: 10 mg. 60 Cap 0    ondansetron hcl (ZOFRAN) 4 mg tablet Take 4 mg by mouth every eight (8) hours as needed for Nausea.        simethicone (MYLICON) 80 mg chewable tablet Take 1 Tab by mouth four (4) times daily as needed. 90 Tab 0    naloxone (NARCAN) 2 mg/actuation spry Use 1 spray intranasally, then discard. Repeat with new spray every 2 min as needed for opioid overdose symptoms, alternating nostrils. 1 Container 0    promethazine (PHENERGAN) 25 mg tablet Take 25 mg by mouth every six (6) hours as needed.        mirtazapine (REMERON) 15 mg tablet Take 15 mg by mouth nightly. Per Pt. He uses PRN        loperamide (IMMODIUM) 2 mg tablet Take 2 mg by mouth four (4) times daily as needed.            No current facility-administered medications on file prior to visit.          No Known Allergies     Family History   Family history unknown:  Yes         Social History            Socioeconomic History    Marital status:        Spouse name: Not on file    Number of children: Not on file    Years of education: Not on file    Highest education level: Not on file   Social Needs    Financial resource strain: Not on file    Food insecurity - worry: Not on file    Food insecurity - inability: Not on file    Transportation needs - medical: Not on file   iFood needs - non-medical: Not on file   Occupational History    Not on file   Tobacco Use    Smoking status: Former Smoker    Smokeless tobacco: Never Used    Tobacco comment: 1979 quit   Substance and Sexual Activity    Alcohol use: No    Drug use: No    Sexual activity: Not on file   Other Topics Concern    Not on file   Social History Narrative    Not on file         Review of Systems  Constitutional: Fever: No  Skin: Rash: No  HEENT: Hearing difficulty: No  Eyes: Blurred vision: No  Cardiovascular: Chest pain: No  Respiratory: Shortness of breath: Yes  Gastrointestinal: Nausea/vomiting: Yes  Musculoskeletal: Back pain: No  Neurological: Weakness: Yes  pt states a little dizzy   Psychological: Memory loss: No  Comments/additional findings:   All other systems reviewed and are negative        PHYSICAL EXAMINATION:   Visit Vitals  /76   Ht 5' 6\" (1.676 m)   Wt 114 lb (51.7 kg)   BMI 18.40 kg/m²      Constitutional: WDWN, Pleasant and appropriate affect, No acute distress. CV:  No peripheral swelling noted  Respiratory: No respiratory distress or difficulties  Skin: No jaundice. Neuro/Psych:  Alert and oriented x 3, affect appropriate. Lymphatic:   No enlarged inguinal lymph nodes. UA:        Results for orders placed or performed in visit on 10/16/18   AMB POC URINALYSIS DIP STICK AUTO W/O MICRO   Result Value Ref Range     Color (UA POC) Yellow       Clarity (UA POC) Clear       Glucose (UA POC) Negative Negative     Bilirubin (UA POC) Negative Negative     Ketones (UA POC) Trace Negative     Specific gravity (UA POC) 1.025 1.001 - 1.035     Blood (UA POC) Trace Negative     pH (UA POC) 6.0 4.6 - 8.0     Protein (UA POC) 1+ Negative     Urobilinogen (UA POC) 0.2 mg/dL 0.2 - 1     Nitrites (UA POC) Negative Negative     Leukocyte esterase (UA POC) 1+ Negative         Lucian Meza M.D.   [facility]   [address] [facility], [address] [facility]   Phone: [phone]   Fax: [phone]

## 2018-11-14 NOTE — OP NOTES
[address] REPORT    Romulo Rudd  MR#: [id number]  : 1960  ACCOUNT #: [de-identified]   DATE OF SERVICE: 2018    PREOPERATIVE DIAGNOSES:  Right proximal ureteral calculus with obstruction and previous sepsis, status post stent placement. POSTOPERATIVE DIAGNOSES:  Right proximal ureteral calculus with obstruction and previous sepsis, status post stent placement with stone impacted in the proximal ureter on the patient's right side. PROCEDURES PERFORMED:  Cystoscopy, right retrograde, right ureteroscopy with laser lithotripsy of stone and double-J stent exchange. SURGEON:  Alyssa Thompson MD    ASSISTANT:  None. PROCEDURE START TIME:  3:25 p.m. PROCEDURE END TIME:  4:13 p.m. ANESTHESIA:  General.    ESTIMATED BLOOD LOSS:  Minimal.    SPECIMENS REMOVED:  Two miniscule right ureteral calculi fragments. FINDINGS:  An impacted 4 mm proximal ureteral calculus located in a similar position of the original stone in the proximal right ureter. This was fragmented to dust only. Two fragments were available for chemical analysis. The rest were too small and passed spontaneously. COMPLICATIONS:  None. IMPLANTS:  6-Namibian x 24 cm double-J ureteral stent. Good curl in the renal pelvis and good curl distally in the bladder. Long string attached for future stent removal.    INDICATIONS:  This is a 22-year-old male who had acute right renal colic in the recent past.  He had stent placement and he had developed a septic event and after this he now has negative urine and is now here for definitive stone procedure. DESCRIPTION OF PROCEDURE:  Patient was identified in the holding area, given informed consent, was then taken to the cystoscopy suite. He was placed on the cystoscopy table in supine position.   After adequate anesthesia, the patient was placed in dorsal lithotomy position, appropriately padded, prepped and draped in the usual sterile fashion for cystoscopy. Timeout was taken and all in agreement on procedure, laterality, burn precautions, beta blocker concerns were addressed. SCDs were placed for DVT prophylaxis and appropriate parenteral prophylactic antibiotics were administered. The patient then underwent cystoscopy using 21-Romanian cystoscope sheath and 30 and 70 degree lenses. There was unremarkable bladder except for an indwelling stent that was moderately encrusted distally. I removed the double-J stent to the urethral meatus. I did take out a little bit extra so I could cut off the encrusted curled portion of the bladder, portion of the stent. I cut this and then placed the 0.038 Glidewire on the remaining ureteral stent up the ureter into the renal collecting system. I then removed the rest of the old stent, everything in total out as far as the old stent. At this point, I then performed ureteroscopy with the semi-rigid ureteroscope. I was able to get up to the mid ureter, but could not get up into the proximal ureter easily. I therefore removed the semirigid ureteroscope. Before I did I put in a second wire of a 0.038 sensor wire. I then used this 0.038 sensor wire after removal of the semirigid ureteroscope. I then used this as a working wire and performed flexible ureteroscopy up to the proximal ureter around L4 and encountered the stone. I then used the 200 micron fiber and performed laser lithotripsy in the dusting setting of the stone and completely eradicated the stone with only 2 tiny pieces found for removal for chemical analysis. This was extracted with a nitinol basket. After this, I saw no further stones. I went up into the kidney and saw some Pineda plaques, but no other stones noted. The procedure was terminated. I left the safety wire in place, was 0.038 Glidewire and over this placed a 6-Romanian x 24 cm double-J ureteral stent with good curl in the renal pelvis, good curl in the bladder. Procedure was terminated. The patient was taken to recovery room in stable condition.       Macy Dawson MD       [facility] / LN  D: 11/13/2018 23:37     T: 11/14/2018 05:16  JOB #: [id number]  CC: Ashish Chandra MD  CC: Lola Omer MD

## 2018-11-16 LAB
CA PHOS MFR STONE: 5 %
CALCIUM OXALATE DIHYDRATE MFR STONE IR: 10 %
COLOR STONE: NORMAL
COM MFR STONE: 85 %
COMMENT, 120677: NORMAL
COMMENT, 519994: NORMAL
COMPOSITION, 120440: NORMAL
DISCLAIMER, STO32L: NORMAL
NIDUS STONE QL: NORMAL
SIZE STONE: NORMAL MM
WT STONE: <1 MG

## 2018-12-06 PROBLEM — K56.609 SBO (SMALL BOWEL OBSTRUCTION) (HCC): Status: ACTIVE | Noted: 2018-12-06

## 2018-12-09 NOTE — PROGRESS NOTES
Will address at upcoming visit in February. Please also order a Litholink so we can go over those results as well thank you.

## 2020-01-20 PROBLEM — G56.21 ULNAR NEUROPATHY AT ELBOW OF RIGHT UPPER EXTREMITY: Status: ACTIVE | Noted: 2020-01-20

## 2022-02-04 PROBLEM — K56.600 PARTIAL SMALL BOWEL OBSTRUCTION (HCC): Status: ACTIVE | Noted: 2022-02-04

## (undated) DEVICE — KIT COLON W/ 1.1OZ LUB AND 2 END

## (undated) DEVICE — CONTAINER DRN 20L DISP FLUIDRN LLS

## (undated) DEVICE — TRAY PREP DRY W/ PREM GLV 2 APPL 6 SPNG 2 UNDPD 1 OVERWRAP

## (undated) DEVICE — STERILE POLYISOPRENE POWDER-FREE SURGICAL GLOVES: Brand: PROTEXIS

## (undated) DEVICE — IRRIGATION KT PMP SGL ACT SAPS -- BX/5

## (undated) DEVICE — GDWIRE 3CM FLX-TIP 0.038X150CM -- BX/5 SENSOR

## (undated) DEVICE — HYDROGEL COATED URETERAL DILATOR: Brand: NOTTINGHAM ONE-STEP

## (undated) DEVICE — BAG DRNGE NONSTERILE W/ SUCT HOSE CYSTO/UROLOGICAL FOR GE

## (undated) DEVICE — SYRINGE MED 20ML STD CLR PLAS LUERLOCK TIP N CTRL DISP

## (undated) DEVICE — SOLUTION IRRIG 3000ML 0.9% SOD CHL FLX CONT 0797208] ICU MEDICAL INC]

## (undated) DEVICE — OPEN-END FLEXI-TIP URETERAL CATHETER: Brand: FLEXI-TIP

## (undated) DEVICE — GDWIREGLDEWIRE 0.038INX150CM --

## (undated) DEVICE — SOL IRR NACL 0.9% 500ML POUR --

## (undated) DEVICE — SOLUTION IV STRL H2O 500 ML AQUALITE POUR BTL

## (undated) DEVICE — SPONGE GZ W4XL4IN COT 12 PLY TYP VII WVN C FLD DSGN

## (undated) DEVICE — Device

## (undated) DEVICE — KENDALL SCD EXPRESS SLEEVES, KNEE LENGTH, MEDIUM: Brand: KENDALL SCD

## (undated) DEVICE — NITINOL STONE RETRIEVAL BASKET: Brand: ZERO TIP

## (undated) DEVICE — TUBING IRRIG L77IN DIA0.241IN L BOR FOR CYSTO W/ NVENT

## (undated) DEVICE — DUAL LUMEN URETERAL CATHETER